# Patient Record
Sex: FEMALE | Race: WHITE | Employment: OTHER | URBAN - NONMETROPOLITAN AREA
[De-identification: names, ages, dates, MRNs, and addresses within clinical notes are randomized per-mention and may not be internally consistent; named-entity substitution may affect disease eponyms.]

---

## 2018-11-15 RX ORDER — ATORVASTATIN CALCIUM 20 MG/1
20 TABLET, FILM COATED ORAL DAILY
COMMUNITY

## 2018-11-15 RX ORDER — PHENTERMINE HYDROCHLORIDE 37.5 MG/1
37.5 TABLET ORAL
COMMUNITY

## 2018-11-15 RX ORDER — CYCLOBENZAPRINE HCL 5 MG
5 TABLET ORAL 3 TIMES DAILY PRN
COMMUNITY

## 2018-11-15 RX ORDER — ACETAMINOPHEN 500 MG
500 TABLET ORAL EVERY 6 HOURS PRN
COMMUNITY

## 2018-11-15 RX ORDER — HYDROCHLOROTHIAZIDE 25 MG/1
25 TABLET ORAL DAILY
COMMUNITY

## 2018-11-15 RX ORDER — ALPRAZOLAM 0.25 MG/1
0.25 TABLET ORAL PRN
COMMUNITY
End: 2018-11-21

## 2018-11-15 RX ORDER — MONTELUKAST SODIUM 10 MG/1
10 TABLET ORAL NIGHTLY
COMMUNITY

## 2018-11-15 RX ORDER — MECLIZINE HYDROCHLORIDE 25 MG/1
25 TABLET ORAL 3 TIMES DAILY PRN
COMMUNITY

## 2018-11-21 ENCOUNTER — HOSPITAL ENCOUNTER (OUTPATIENT)
Age: 58
Setting detail: SPECIMEN
Discharge: HOME | End: 2018-11-21
Payer: COMMERCIAL

## 2018-11-21 ENCOUNTER — INITIAL CONSULT (OUTPATIENT)
Dept: SURGERY | Age: 58
End: 2018-11-21
Payer: COMMERCIAL

## 2018-11-21 VITALS
WEIGHT: 222 LBS | HEIGHT: 65 IN | SYSTOLIC BLOOD PRESSURE: 142 MMHG | HEART RATE: 87 BPM | DIASTOLIC BLOOD PRESSURE: 90 MMHG | BODY MASS INDEX: 36.99 KG/M2 | TEMPERATURE: 97.2 F

## 2018-11-21 DIAGNOSIS — Z87.891 HISTORY OF CIGARETTE SMOKING: ICD-10-CM

## 2018-11-21 DIAGNOSIS — R59.0 LYMPHADENOPATHY, CERVICAL: Primary | ICD-10-CM

## 2018-11-21 DIAGNOSIS — R59.0 LYMPHADENOPATHY, CERVICAL: ICD-10-CM

## 2018-11-21 PROCEDURE — 10021 FNA BX W/O IMG GDN 1ST LES: CPT | Performed by: SURGERY

## 2018-11-21 PROCEDURE — 99204 OFFICE O/P NEW MOD 45 MIN: CPT | Performed by: SURGERY

## 2018-11-21 PROCEDURE — 88173 CYTOPATH EVAL FNA REPORT: CPT

## 2018-11-21 PROCEDURE — 88305 TISSUE EXAM BY PATHOLOGIST: CPT

## 2018-11-21 ASSESSMENT — ENCOUNTER SYMPTOMS
ABDOMINAL DISTENTION: 0
SINUS PRESSURE: 0
VOICE CHANGE: 0
EYES NEGATIVE: 1
ABDOMINAL PAIN: 0
DIARRHEA: 0
WHEEZING: 0
NAUSEA: 0
CHEST TIGHTNESS: 0
BACK PAIN: 0
BLOOD IN STOOL: 0
SHORTNESS OF BREATH: 0
TROUBLE SWALLOWING: 0
CONSTIPATION: 0
SORE THROAT: 1
CHOKING: 0
VOMITING: 0
SINUS PAIN: 0
COUGH: 1

## 2018-11-21 NOTE — PROGRESS NOTES
Patient present with a lump in there right side of her neck for a little more than month. No pain. Does have sore throat since she start taking Adipex. No dental problems. Does feel like she has a lot of sinus drainage. No fever or night sweats. Has a little cough intermittently. No other lumps or bumps. No difficulty swallowing or change in her voice. Quit smoking in 2005. Has been a light alcohol drinker (4 or 5 a year) for entire life. Never used smokeless tobacco.    Has deliberately lost 15 lbs with diet and Adipex since October. Past Medical History:   Diagnosis Date    Allergic rhinitis     GERD (gastroesophageal reflux disease)     Hypertension     Migraines     Obesity     RA (rheumatoid arthritis) (HCC)     Swollen lymph nodes     Uterine fibroid     Vertigo      Past Surgical History:   Procedure Laterality Date    APPENDECTOMY  1968    CARPAL TUNNEL RELEASE  1994    CHOLECYSTECTOMY  1981    FOOT SURGERY       Current Outpatient Prescriptions   Medication Sig Dispense Refill    atorvastatin (LIPITOR) 20 MG tablet Take 20 mg by mouth daily      phentermine (ADIPEX-P) 37.5 MG tablet Take 37.5 mg by mouth every morning (before breakfast). Nikole Haque hydrochlorothiazide (HYDRODIURIL) 25 MG tablet Take 25 mg by mouth daily      montelukast (SINGULAIR) 10 MG tablet Take 10 mg by mouth nightly      meloxicam (MOBIC) 7.5 MG tablet Take 15 mg by mouth daily      sulfaSALAzine (AZULFIDINE) 500 MG tablet Take 500 mg by mouth 3 times daily      potassium chloride 10 MEQ/100ML Take 10 mEq by mouth once      fluticasone (FLONASE) 50 MCG/ACT nasal spray 1 spray by Nasal route as needed for Rhinitis      Omega-3 Fatty Acids (FISH OIL) 1000 MG CAPS Take 1,000 mg by mouth daily      aspirin 81 MG tablet Take 81 mg by mouth daily      lansoprazole (PREVACID 24HR) 15 MG capsule Take 15 mg by mouth daily      acetaminophen (TYLENOL) 500 MG tablet Take 500 mg by mouth every 6 hours as needed little suspicious. The easiest next step is to do a FNA. Since this is readily palpable it can be done without imaging. If finding are suspicious more extensive imaging and/or endoscopy is indicated. It not suspicious, observation would be the preferred option. She was agreeable. Procedure:  FNA was preformed with a 23 gauge needle. Several passes through the node was done. Tolerated well. Follow up in 1 week for results.

## 2018-11-23 LAB
CASE NUMBER:: NORMAL
SPECIMEN DESCRIPTION: NORMAL

## 2018-11-26 LAB — SURGICAL PATHOLOGY REPORT: NORMAL

## 2018-11-28 ENCOUNTER — OFFICE VISIT (OUTPATIENT)
Dept: SURGERY | Age: 58
End: 2018-11-28
Payer: COMMERCIAL

## 2018-11-28 VITALS
HEART RATE: 96 BPM | DIASTOLIC BLOOD PRESSURE: 92 MMHG | WEIGHT: 244.05 LBS | SYSTOLIC BLOOD PRESSURE: 138 MMHG | BODY MASS INDEX: 40.66 KG/M2 | RESPIRATION RATE: 16 BRPM | HEIGHT: 65 IN

## 2018-11-28 DIAGNOSIS — R59.0 LYMPHADENOPATHY, CERVICAL: Primary | ICD-10-CM

## 2018-11-28 PROCEDURE — 99212 OFFICE O/P EST SF 10 MIN: CPT | Performed by: SURGERY

## 2018-11-28 ASSESSMENT — PATIENT HEALTH QUESTIONNAIRE - PHQ9
SUM OF ALL RESPONSES TO PHQ QUESTIONS 1-9: 0
1. LITTLE INTEREST OR PLEASURE IN DOING THINGS: 0
SUM OF ALL RESPONSES TO PHQ QUESTIONS 1-9: 0
SUM OF ALL RESPONSES TO PHQ9 QUESTIONS 1 & 2: 0
2. FEELING DOWN, DEPRESSED OR HOPELESS: 0

## 2018-12-07 ENCOUNTER — TELEPHONE (OUTPATIENT)
Dept: SURGERY | Age: 58
End: 2018-12-07

## 2019-01-02 ENCOUNTER — OFFICE VISIT (OUTPATIENT)
Dept: SURGERY | Age: 59
End: 2019-01-02

## 2019-01-02 VITALS
HEIGHT: 65 IN | DIASTOLIC BLOOD PRESSURE: 64 MMHG | TEMPERATURE: 97.8 F | SYSTOLIC BLOOD PRESSURE: 120 MMHG | BODY MASS INDEX: 36.15 KG/M2 | HEART RATE: 88 BPM | WEIGHT: 217 LBS

## 2019-01-02 DIAGNOSIS — C83.31 DIFFUSE LARGE B-CELL LYMPHOMA OF LYMPH NODES OF NECK (HCC): Primary | ICD-10-CM

## 2019-01-02 PROCEDURE — 99024 POSTOP FOLLOW-UP VISIT: CPT | Performed by: SURGERY

## 2019-01-08 ENCOUNTER — TELEPHONE (OUTPATIENT)
Dept: SURGERY | Age: 59
End: 2019-01-08

## 2019-09-23 ENCOUNTER — OFFICE VISIT (OUTPATIENT)
Dept: OPTOMETRY | Age: 59
End: 2019-09-23
Payer: COMMERCIAL

## 2019-09-23 DIAGNOSIS — H52.203 HYPEROPIA OF BOTH EYES WITH ASTIGMATISM AND PRESBYOPIA: Primary | ICD-10-CM

## 2019-09-23 DIAGNOSIS — H52.03 HYPEROPIA OF BOTH EYES WITH ASTIGMATISM AND PRESBYOPIA: Primary | ICD-10-CM

## 2019-09-23 DIAGNOSIS — H52.4 HYPEROPIA OF BOTH EYES WITH ASTIGMATISM AND PRESBYOPIA: Primary | ICD-10-CM

## 2019-09-23 PROCEDURE — 92004 COMPRE OPH EXAM NEW PT 1/>: CPT | Performed by: OPTOMETRIST

## 2019-09-23 ASSESSMENT — REFRACTION_MANIFEST
OD_AXIS: 160
OS_AXIS: 080
OD_SPHERE: +1.25
OS_SPHERE: +1.25
OS_ADD: +2.25
OS_CYLINDER: -0.50
OD_ADD: +2.25
OD_CYLINDER: -0.50

## 2019-09-23 ASSESSMENT — VISUAL ACUITY
CORRECTION_TYPE: GLASSES
METHOD: SNELLEN - LINEAR
OS_CC: 20/25
OD_CC: 20/25 OU

## 2019-09-23 ASSESSMENT — REFRACTION_WEARINGRX
OD_AXIS: 175
OS_CYLINDER: -0.25
OS_AXIS: 178
OD_ADD: +2.25
OD_CYLINDER: -0.50
OS_SPHERE: +0.75
OS_ADD: +2.25
OD_SPHERE: +0.75
SPECS_TYPE: PAL

## 2019-09-23 ASSESSMENT — SLIT LAMP EXAM - LIDS
COMMENTS: NORMAL
COMMENTS: NORMAL

## 2019-09-23 ASSESSMENT — TONOMETRY
IOP_METHOD: NON-CONTACT AIR PUFF
OD_IOP_MMHG: 21
OS_IOP_MMHG: 22

## 2019-11-05 ENCOUNTER — OFFICE VISIT (OUTPATIENT)
Dept: OPTOMETRY | Age: 59
End: 2019-11-05
Payer: COMMERCIAL

## 2019-11-05 DIAGNOSIS — Z79.899 HIGH RISK MEDICATION USE: Primary | ICD-10-CM

## 2019-11-05 PROCEDURE — 99213 OFFICE O/P EST LOW 20 MIN: CPT | Performed by: OPTOMETRIST

## 2019-11-05 PROCEDURE — 92250 FUNDUS PHOTOGRAPHY W/I&R: CPT | Performed by: OPTOMETRIST

## 2019-11-05 RX ORDER — PHENYLEPHRINE HCL 2.5 %
1 DROPS OPHTHALMIC (EYE) ONCE
Status: COMPLETED | OUTPATIENT
Start: 2019-11-05 | End: 2019-11-05

## 2019-11-05 RX ORDER — TROPICAMIDE 10 MG/ML
1 SOLUTION/ DROPS OPHTHALMIC ONCE
Status: COMPLETED | OUTPATIENT
Start: 2019-11-05 | End: 2019-11-05

## 2019-11-05 RX ADMIN — TROPICAMIDE 1 DROP: 10 SOLUTION/ DROPS OPHTHALMIC at 11:19

## 2019-11-05 RX ADMIN — Medication 1 DROP: at 11:19

## 2019-11-05 ASSESSMENT — VISUAL ACUITY
OS_CC+: -2
OS_CC: 20/25
OD_CC: 20/20 OU
CORRECTION_TYPE: GLASSES
METHOD: SNELLEN - LINEAR

## 2019-11-05 ASSESSMENT — REFRACTION_WEARINGRX
OS_CYLINDER: -0.50
OD_ADD: +2.25
OD_SPHERE: +1.25
OS_AXIS: 080
OS_ADD: +2.25
SPECS_TYPE: PAL
OS_SPHERE: +1.25
OD_AXIS: 160
OD_CYLINDER: -0.50

## 2019-11-05 ASSESSMENT — ENCOUNTER SYMPTOMS
ALLERGIC/IMMUNOLOGIC NEGATIVE: 0
GASTROINTESTINAL NEGATIVE: 0
EYES NEGATIVE: 0
RESPIRATORY NEGATIVE: 0